# Patient Record
Sex: MALE | Race: WHITE | NOT HISPANIC OR LATINO | Employment: FULL TIME | URBAN - METROPOLITAN AREA
[De-identification: names, ages, dates, MRNs, and addresses within clinical notes are randomized per-mention and may not be internally consistent; named-entity substitution may affect disease eponyms.]

---

## 2017-11-13 ENCOUNTER — OFFICE VISIT (OUTPATIENT)
Dept: URGENT CARE | Facility: CLINIC | Age: 22
End: 2017-11-13
Payer: COMMERCIAL

## 2017-11-13 ENCOUNTER — APPOINTMENT (OUTPATIENT)
Dept: RADIOLOGY | Facility: IMAGING CENTER | Age: 22
End: 2017-11-13
Attending: NURSE PRACTITIONER
Payer: COMMERCIAL

## 2017-11-13 VITALS
DIASTOLIC BLOOD PRESSURE: 76 MMHG | BODY MASS INDEX: 21.17 KG/M2 | OXYGEN SATURATION: 96 % | HEIGHT: 74 IN | WEIGHT: 165 LBS | RESPIRATION RATE: 18 BRPM | HEART RATE: 92 BPM | TEMPERATURE: 97.7 F | SYSTOLIC BLOOD PRESSURE: 118 MMHG

## 2017-11-13 DIAGNOSIS — R05.8 PRODUCTIVE COUGH: ICD-10-CM

## 2017-11-13 DIAGNOSIS — R06.02 SOB (SHORTNESS OF BREATH): ICD-10-CM

## 2017-11-13 DIAGNOSIS — J40 BRONCHITIS: ICD-10-CM

## 2017-11-13 PROCEDURE — 94640 AIRWAY INHALATION TREATMENT: CPT | Performed by: NURSE PRACTITIONER

## 2017-11-13 PROCEDURE — 94760 N-INVAS EAR/PLS OXIMETRY 1: CPT | Performed by: NURSE PRACTITIONER

## 2017-11-13 PROCEDURE — 71020 DX-CHEST-2 VIEWS: CPT | Mod: TC | Performed by: NURSE PRACTITIONER

## 2017-11-13 PROCEDURE — 99203 OFFICE O/P NEW LOW 30 MIN: CPT | Mod: 25 | Performed by: NURSE PRACTITIONER

## 2017-11-13 RX ORDER — AZITHROMYCIN 250 MG/1
TABLET, FILM COATED ORAL
Qty: 6 TAB | Refills: 0 | Status: SHIPPED | OUTPATIENT
Start: 2017-11-13

## 2017-11-13 RX ORDER — BENZONATATE 200 MG/1
200 CAPSULE ORAL 3 TIMES DAILY PRN
Qty: 60 CAP | Refills: 0 | Status: SHIPPED | OUTPATIENT
Start: 2017-11-13

## 2017-11-13 RX ORDER — ALBUTEROL SULFATE 2.5 MG/3ML
2.5 SOLUTION RESPIRATORY (INHALATION) ONCE
Status: COMPLETED | OUTPATIENT
Start: 2017-11-13 | End: 2017-11-13

## 2017-11-13 RX ORDER — ALBUTEROL SULFATE 90 UG/1
2 AEROSOL, METERED RESPIRATORY (INHALATION) EVERY 6 HOURS PRN
Qty: 8.5 G | Refills: 0 | Status: SHIPPED | OUTPATIENT
Start: 2017-11-13

## 2017-11-13 RX ADMIN — ALBUTEROL SULFATE 2.5 MG: 2.5 SOLUTION RESPIRATORY (INHALATION) at 16:00

## 2017-11-13 ASSESSMENT — ENCOUNTER SYMPTOMS
DIZZINESS: 0
CHILLS: 0
RHINORRHEA: 1
VOMITING: 0
COUGH: 1
CARDIOVASCULAR NEGATIVE: 1
EYE DISCHARGE: 0
MYALGIAS: 0
HEADACHES: 1
EYES NEGATIVE: 1
FEVER: 0
NAUSEA: 0

## 2017-11-13 NOTE — PROGRESS NOTES
Subjective:      Subhash Moctezuma is a 22 y.o. male who presents with Cough (sinus pain, pressure in jaw, and ears. X 5 days )    Past Medical History:   Diagnosis Date   • ADHD (attention deficit hyperactivity disorder)     unmedicated     Social History     Social History   • Marital status: Single     Spouse name: N/A   • Number of children: N/A   • Years of education: N/A     Occupational History   • Not on file.     Social History Main Topics   • Smoking status: Current Every Day Smoker     Packs/day: 0.25     Types: Cigarettes     Last attempt to quit: 12/1/2014   • Smokeless tobacco: Never Used      Comment: cutting down   • Alcohol use Yes      Comment: social    • Drug use: No   • Sexual activity: Yes      Comment: always use condoms     Other Topics Concern   • Not on file     Social History Narrative   • No narrative on file     History reviewed. No pertinent family history.    Allergies: Review of patient's allergies indicates no known allergies.    This patient is a 22-year-old male who presents today with complaint of productive cough with aches and chills and shortness of breath. He has also had some sinus pain and pressure with moderately sore throat. Symptoms started 5 days ago. He denies fevers. Patient is currently a daily smoker, states he is trying to cut down and is currently smoking about 3 cigarettes daily. Counseling given, patient declined referral for smoking cessation at this time.          Cough   This is a new problem. The current episode started in the past 7 days. The problem has been unchanged. The problem occurs every few minutes. The cough is productive of sputum. Associated symptoms include ear congestion, ear pain, headaches, nasal congestion, postnasal drip and rhinorrhea. Pertinent negatives include no chills, fever or myalgias. Associated symptoms comments: Sinus pain and pressure . Nothing aggravates the symptoms. He has tried nothing for the symptoms. The treatment provided no  "relief.       Review of Systems   Constitutional: Positive for malaise/fatigue. Negative for chills and fever.   HENT: Positive for ear pain, postnasal drip and rhinorrhea.    Eyes: Negative.  Negative for discharge.   Respiratory: Positive for cough.    Cardiovascular: Negative.    Gastrointestinal: Negative for nausea and vomiting.   Musculoskeletal: Negative for myalgias.   Skin: Negative.    Neurological: Positive for headaches. Negative for dizziness.   All other systems reviewed and are negative.         Objective:     /76   Pulse 92   Temp 36.5 °C (97.7 °F)   Resp 18   Ht 1.88 m (6' 2\")   Wt 74.8 kg (165 lb)   SpO2 96%   BMI 21.18 kg/m²      Physical Exam   Constitutional: He is oriented to person, place, and time. He appears well-developed and well-nourished. No distress.   HENT:   Head: Normocephalic.   Right Ear: External ear normal.   Left Ear: External ear normal.   Mouth/Throat: No oropharyngeal exudate.   Eyes: EOM are normal. Pupils are equal, round, and reactive to light.   Neck: Normal range of motion. Neck supple.   Cardiovascular: Normal rate and regular rhythm.    Pulmonary/Chest: Effort normal. He has wheezes. He has rales.   Musculoskeletal: Normal range of motion.   Neurological: He is alert and oriented to person, place, and time.   Skin: Skin is warm and dry. Capillary refill takes less than 2 seconds. He is not diaphoretic.   Psychiatric: He has a normal mood and affect. His behavior is normal. Judgment and thought content normal.   Vitals reviewed.    XR chest:     HISTORY/REASON FOR EXAM:  Cough    TECHNIQUE/EXAM DESCRIPTION: PA and lateral views of the chest.    COMPARISON:  None.    FINDINGS:  The lungs are clear.  The cardiac silhouette is normal in size.  No effusions or pneumothoraces are present.  There are no significant osseous abnormalities.  The visualized portions of the upper abdomen are within normal limits.     Impression       NEGATIVE TWO VIEWS OF THE CHEST. "         Post treatment:     Patient reports moderate subjective improvement, sats 97% RA, checked by me            Assessment/Plan:   Bronchitis  Cough   -Zithromax   -albuterol inhaler   -tessalon PRN cough   -ER precautions for respiratory distress    -Follow up otherwise for any further questions or concerns.      There are no diagnoses linked to this encounter.

## 2017-12-06 ENCOUNTER — HOSPITAL ENCOUNTER (EMERGENCY)
Facility: MEDICAL CENTER | Age: 22
End: 2017-12-06
Attending: EMERGENCY MEDICINE
Payer: COMMERCIAL

## 2017-12-06 VITALS
BODY MASS INDEX: 24.47 KG/M2 | WEIGHT: 190.7 LBS | RESPIRATION RATE: 16 BRPM | DIASTOLIC BLOOD PRESSURE: 51 MMHG | OXYGEN SATURATION: 99 % | HEIGHT: 74 IN | HEART RATE: 70 BPM | TEMPERATURE: 97.6 F | SYSTOLIC BLOOD PRESSURE: 107 MMHG

## 2017-12-06 DIAGNOSIS — S61.213A LACERATION OF LEFT MIDDLE FINGER WITHOUT FOREIGN BODY WITHOUT DAMAGE TO NAIL, INITIAL ENCOUNTER: ICD-10-CM

## 2017-12-06 PROCEDURE — 303485 HCHG DRESSING MEDIUM

## 2017-12-06 PROCEDURE — 99283 EMERGENCY DEPT VISIT LOW MDM: CPT

## 2017-12-06 PROCEDURE — 304999 HCHG REPAIR-SIMPLE/INTERMED LEVEL 1

## 2017-12-06 PROCEDURE — 700101 HCHG RX REV CODE 250: Performed by: EMERGENCY MEDICINE

## 2017-12-06 PROCEDURE — 303747 HCHG EXTRA SUTURE

## 2017-12-06 RX ORDER — LIDOCAINE HYDROCHLORIDE 20 MG/ML
20 INJECTION, SOLUTION INFILTRATION; PERINEURAL ONCE
Status: COMPLETED | OUTPATIENT
Start: 2017-12-06 | End: 2017-12-06

## 2017-12-06 RX ORDER — HYDROCODONE BITARTRATE AND ACETAMINOPHEN 5; 325 MG/1; MG/1
1 TABLET ORAL EVERY 4 HOURS PRN
Qty: 5 TAB | Refills: 0 | Status: SHIPPED | OUTPATIENT
Start: 2017-12-06

## 2017-12-06 RX ADMIN — LIDOCAINE HYDROCHLORIDE 20 ML: 20 INJECTION, SOLUTION INFILTRATION; PERINEURAL at 16:00

## 2017-12-06 NOTE — DISCHARGE INSTRUCTIONS
Laceration Care, Adult  A laceration is a cut that goes through all layers of the skin. The cut also goes into the tissue that is right under the skin. Some cuts heal on their own. Others need to be closed with stitches (sutures), staples, skin adhesive strips, or wound glue. Taking care of your cut lowers your risk of infection and helps your cut to heal better.  HOW TO TAKE CARE OF YOUR CUT  For stitches or staples:  · Keep the wound clean and dry.  · If you were given a bandage (dressing), you should change it at least one time per day or as told by your doctor. You should also change it if it gets wet or dirty.  · Keep the wound completely dry for the first 24 hours or as told by your doctor. After that time, you may take a shower or a bath. However, make sure that the wound is not soaked in water until after the stitches or staples have been removed.  · Clean the wound one time each day or as told by your doctor:  ¨ Wash the wound with soap and water.  ¨ Rinse the wound with water until all of the soap comes off.  ¨ Pat the wound dry with a clean towel. Do not rub the wound.  · After you clean the wound, put a thin layer of antibiotic ointment on it as told by your doctor. This ointment:  ¨ Helps to prevent infection.  ¨ Keeps the bandage from sticking to the wound.  · Have your stitches or staples removed as told by your doctor.  If your doctor used skin adhesive strips:   · Keep the wound clean and dry.  · If you were given a bandage, you should change it at least one time per day or as told by your doctor. You should also change it if it gets dirty or wet.  · Do not get the skin adhesive strips wet. You can take a shower or a bath, but be careful to keep the wound dry.  · If the wound gets wet, pat it dry with a clean towel. Do not rub the wound.  · Skin adhesive strips fall off on their own. You can trim the strips as the wound heals. Do not remove any strips that are still stuck to the wound. They will  fall off after a while.  If your doctor used wound glue:  · Try to keep your wound dry, but you may briefly wet it in the shower or bath. Do not soak the wound in water, such as by swimming.  · After you take a shower or a bath, gently pat the wound dry with a clean towel. Do not rub the wound.  · Do not do any activities that will make you really sweaty until the skin glue has fallen off on its own.  · Do not apply liquid, cream, or ointment medicine to your wound while the skin glue is still on.  · If you were given a bandage, you should change it at least one time per day or as told by your doctor. You should also change it if it gets dirty or wet.  · If a bandage is placed over the wound, do not let the tape for the bandage touch the skin glue.  · Do not pick at the glue. The skin glue usually stays on for 5-10 days. Then, it falls off of the skin.  General Instructions   · To help prevent scarring, make sure to cover your wound with sunscreen whenever you are outside after stitches are removed, after adhesive strips are removed, or when wound glue stays in place and the wound is healed. Make sure to wear a sunscreen of at least 30 SPF.  · Take over-the-counter and prescription medicines only as told by your doctor.  · If you were given antibiotic medicine or ointment, take or apply it as told by your doctor. Do not stop using the antibiotic even if your wound is getting better.  · Do not scratch or pick at the wound.  · Keep all follow-up visits as told by your doctor. This is important.  · Check your wound every day for signs of infection. Watch for:  ¨ Redness, swelling, or pain.  ¨ Fluid, blood, or pus.  · Raise (elevate) the injured area above the level of your heart while you are sitting or lying down, if possible.  GET HELP IF:  · You got a tetanus shot and you have any of these problems at the injection site:  ¨ Swelling.  ¨ Very bad pain.  ¨ Redness.  ¨ Bleeding.  · You have a fever.  · A wound that was  closed breaks open.  · You notice a bad smell coming from your wound or your bandage.  · You notice something coming out of the wound, such as wood or glass.  · Medicine does not help your pain.  · You have more redness, swelling, or pain at the site of your wound.  · You have fluid, blood, or pus coming from your wound.  · You notice a change in the color of your skin near your wound.  · You need to change the bandage often because fluid, blood, or pus is coming from the wound.  · You start to have a new rash.  · You start to have numbness around the wound.  GET HELP RIGHT AWAY IF:  · You have very bad swelling around the wound.  · Your pain suddenly gets worse and is very bad.  · You notice painful lumps near the wound or on skin that is anywhere on your body.  · You have a red streak going away from your wound.  · The wound is on your hand or foot and you cannot move a finger or toe like you usually can.  · The wound is on your hand or foot and you notice that your fingers or toes look pale or bluish.     This information is not intended to replace advice given to you by your health care provider. Make sure you discuss any questions you have with your health care provider.     Document Released: 06/05/2009 Document Revised: 05/03/2016 Document Reviewed: 12/14/2015  [employer] Patient Education ©2016 [employer]

## 2017-12-06 NOTE — ED NOTES
Chief Complaint   Patient presents with   • Laceration     Pt BIB/ pt reports left finger laceration from knife in kitchen while making lunch. bleeding controlled.      Explained to pt triage process, made pt aware to tell this RN of any changes/concerns, pt verbalized understanding of process and instructions given. Pt to ER sena.

## 2017-12-06 NOTE — ED NOTES
LEFT 3rd digit dressed w/ adaptic and vielka.     Patient discharged to home w/ self. Patient alert and oriented x 4. Patient ambulatory w/ steady gait. Patient verbalizes understanding of discharge instructions, wound/suture care, and follow up. Patient denies questions upon discharge.

## 2017-12-06 NOTE — ED NOTES
Patient ambulatory from triage to [facility] w/ steady gait. Patient oriented x 4. Patient states while at home cooking, he cut his LEFT middle finger w/ cooking knife. Bleeding controlled on arrival w/ pressure. Patient states tetanus vaccination up to date.

## 2017-12-06 NOTE — ED NOTES
Laceration involves LEFT distal 3rd digit. Laceration involves nail bed. Patient states pain is tolerable at this time.     ERP at bedside for physical exam at this time.

## 2017-12-06 NOTE — ED PROVIDER NOTES
"ED Provider Note    CHIEF COMPLAINT  Chief Complaint   Patient presents with   • Laceration     Pt BIB/ pt reports left finger laceration from knife in kitchen while making lunch. bleeding controlled.        HPI  Subhash Moctezuma is a 22 y.o. male who presentsWith a bleeding laceration to the tip of his left middle finger to cutting it on a sharp knife about an hour ago. He works as a . He did it at home. Full range of motion of the finger.    REVIEW OF SYSTEMS  See HPI for further details. No other injury negative    PAST MEDICAL HISTORY  Past Medical History:   Diagnosis Date   • ADHD (attention deficit hyperactivity disorder)     unmedicated       FAMILY HISTORY  Noncontributory    SOCIAL HISTORY  Cigarette smoker    SURGICAL HISTORY  Past Surgical History:   Procedure Laterality Date   • OTHER      removal of birth jordyn from chin at 7 years old       CURRENT MEDICATIONS  Home Medications    **Home medications have not yet been reviewed for this encounter**         ALLERGIES  No Known Allergies    PHYSICAL EXAM  VITAL SIGNS: /51   Pulse 88   Temp 36.4 °C (97.6 °F) (Temporal)   Resp 18   Ht 1.88 m (6' 2\")   Wt 86.5 kg (190 lb 11.2 oz)   SpO2 97%   BMI 24.48 kg/m²   vital signs are noted  Constitutional: Alert healthy-appearing adult in no distress   HENT: Normocephalic   Cardiovascular: Regular rhythm   Thorax & Lungs: No respiratory distress   Skin: Warm, Dry, No rash.   Musculoskeletal:1.5 cm laceration to the left middle fingertip. All tendons intact. Through the nailbed.  Neurologic: Alert  Normal motor function,  No obvious focal deficits noted.   Psychiatric: Affect normal, and mood normal.     COURSE & MEDICAL DECISION MAKING  There is a 1.5 cm laceration to the hand of the left middle finger through the nail bed. Full range of motion of the finger.    Procedure: The 1.5 cm laceration was anesthetized with lidocaine local. The wound was cleaned and irrigated thoroughly. The wound is explored " under bloodless field. No foreign body in the wound. The wound was closed with 3 3-0 nylon sutures. I repaired the nail bed just with the nylon suture. Bandage has been applied.    Treatment: #1 instructions on wound care given number to return in one week for suture removal #3 a prescription for a few hydrocodone.    FINAL IMPRESSION  1. Finger laceration 3rd finger 1.5 cm through the nailbed.         Electronically signed by: Gary Gansert, 12/6/2017 3:49 PM

## 2017-12-14 ENCOUNTER — HOSPITAL ENCOUNTER (EMERGENCY)
Facility: MEDICAL CENTER | Age: 22
End: 2017-12-14
Payer: COMMERCIAL

## 2017-12-14 VITALS
RESPIRATION RATE: 18 BRPM | OXYGEN SATURATION: 97 % | DIASTOLIC BLOOD PRESSURE: 75 MMHG | HEART RATE: 75 BPM | BODY MASS INDEX: 24.16 KG/M2 | TEMPERATURE: 98 F | HEIGHT: 74 IN | WEIGHT: 188.27 LBS | SYSTOLIC BLOOD PRESSURE: 120 MMHG

## 2017-12-14 PROCEDURE — 99281 EMR DPT VST MAYX REQ PHY/QHP: CPT

## 2018-06-18 ENCOUNTER — NON-PROVIDER VISIT (OUTPATIENT)
Dept: OCCUPATIONAL MEDICINE | Facility: CLINIC | Age: 23
End: 2018-06-18

## 2018-06-18 DIAGNOSIS — Z02.1 DRUG TESTING, PRE-EMPLOYMENT: ICD-10-CM

## 2018-06-18 DIAGNOSIS — Z02.1 PRE-EMPLOYMENT DRUG SCREENING: ICD-10-CM

## 2018-06-18 LAB
AMP AMPHETAMINE: NORMAL
BAR BARBITURATES: NORMAL
BZO BENZODIAZEPINES: NORMAL
COC COCAINE: NORMAL
INT CON NEG: NORMAL
INT CON POS: NORMAL
MDMA ECSTASY: NORMAL
MET METHAMPHETAMINES: NORMAL
MTD METHADONE: NORMAL
OPI OPIATES: NORMAL
OXY OXYCODONE: NORMAL
PCP PHENCYCLIDINE: NORMAL
POC URINE DRUG SCREEN OCDRS: NEGATIVE
THC: NORMAL

## 2018-06-18 PROCEDURE — 80305 DRUG TEST PRSMV DIR OPT OBS: CPT | Performed by: PREVENTIVE MEDICINE

## 2023-01-25 NOTE — ED NOTES
Pt ambulatory to triage. Pt was seen 12/6, received three sutures in L 3rd finger. Per note by Dr. Gansert, okay to remove suture one week from placement. Affected area was clean, dry, and intact, no s/sx of infection. Wound was well approximated. Sutures removed w/o difficulty. Pt's finger redressed. Education provided. Pt ambulatory out of ER.    well developed, well nourished , in no acute distress , ambulating without difficulty , normal communication ability

## 2025-04-09 PROBLEM — S56.129A FLEXOR TENDON LACERATION OF FINGER WITH OPEN WOUND: Status: ACTIVE | Noted: 2025-04-09

## 2025-04-09 PROBLEM — S61.209A FLEXOR TENDON LACERATION OF FINGER WITH OPEN WOUND: Status: ACTIVE | Noted: 2025-04-09

## 2025-04-09 PROBLEM — M79.642 LEFT HAND PAIN: Status: ACTIVE | Noted: 2025-04-09

## 2025-08-31 ENCOUNTER — OFFICE VISIT (OUTPATIENT)
Dept: URGENT CARE | Facility: PHYSICIAN GROUP | Age: 30
End: 2025-08-31

## 2025-08-31 VITALS
TEMPERATURE: 98.8 F | HEART RATE: 78 BPM | OXYGEN SATURATION: 97 % | HEIGHT: 72 IN | SYSTOLIC BLOOD PRESSURE: 114 MMHG | BODY MASS INDEX: 25.68 KG/M2 | WEIGHT: 189.6 LBS | DIASTOLIC BLOOD PRESSURE: 72 MMHG | RESPIRATION RATE: 16 BRPM